# Patient Record
(demographics unavailable — no encounter records)

---

## 2017-02-09 NOTE — EMERGENCY ROOM REPORT
History of Present Illness


General


Chief Complaint:  Male Urogenital Problems


Source:  Patient





Present Illness


HPI


This is an 18-year-old male with no medical problem.  He presents with 

hematuria.  Was playing soccer today and the ball hit him in the pelvic area/

groin area.  He was not wearing a protective cup.  When he went home, he noted 

dark color urine.  He has some mild pain with urination.  He has some mild pain 

with sitting.  Urine cleared up but still reddish.  Denies any fever chills 

denies any bruising.  No other injury.  No back injury.


Allergies:  


Coded Allergies:  


     No Known Allergies (Unverified , 1/26/13)





Patient History


Past Medical History:  none


Past Surgical History:  none


Pertinent Family History:  none


Social History:  Denies: smoking


Immunizations:  other


Reviewed Nursing Documentation:  PMH: Agreed, PSxH: Agreed





Nursing Documentation-PMH


Past Medical History:  No Stated History





Review of Systems


Eye:  Denies: blurred vision, eye pain


ENT:  Denies: ear pain, nose congestion, throat swelling


Respiratory:  Denies: cough, shortness of breath


Cardiovascular:  Denies: chest pain, palpitations


Gastrointestinal:  Denies: abdominal pain, diarrhea, nausea, vomiting


Musculoskeletal:  Denies: back pain, joint pain


Skin:  Denies: rash


Neurological:  Denies: headache, numbness


Endocrine:  Denies: increased thirst, increased urine


Hematologic/Lymphatic:  Denies: easy bruising


All Other Systems:  negative except mentioned in HPI





Physical Exam





Vital Signs








  Date Time  Temp Pulse Resp B/P Pulse Ox O2 Delivery O2 Flow Rate FiO2


 


2/8/17 23:09 98.2 70 16 133/80 100 Room Air  





vitals normal


Sp02 EP Interpretation:  reviewed, normal


General Appearance:  well appearing, no apparent distress, alert


Head:  normocephalic, atraumatic


Eyes:  bilateral eye EOMI, bilateral eye PERRL


ENT:  hearing grossly normal, normal pharynx


Neck:  full range of motion, supple, no meningismus


Respiratory:  chest non-tender, lungs clear, normal breath sounds


Cardiovascular #1:  regular rate, rhythm, no murmur


Gastrointestinal:  normal bowel sounds, non tender, no mass, no organomegaly, 

no bruit, non-distended


Genitourinary:  normal inspection, no CVA tenderness, penis normal, other - no 

ecchymosis. no blood at the meatus.


Musculoskeletal:  back normal, gait/station normal, normal range of motion


Neurologic:  alert, oriented x3


Psychiatric:  mood/affect normal


Skin:  warm/dry





Medical Decision Making


Diagnostic Impression:  


 Primary Impression:  


 Hematuria


 Additional Impression:  


 Groin injury


 Qualified Codes:  S39.91XA - Unspecified injury of abdomen, initial encounter


ER Course


Patient presents with hematuria from ongoing injury.  On clinical exam, I see 

no evidence of penile fracture or hematoma or ecchymosis.  No testicular 

injury.  CT scan is negative.  CT scan has finding of periportal edema.  He has 

no symptoms whatsoever.  He may have mild rhabdomyolysis based on elevated CK 

after playing 80 minutes of soccer.  I discussed the case with urologist 

recommend followup. he urinated again here.  Urine is much clearer.


Lab Results Impression


labs unremarkable


CT/MRI/US Diagnostic Results


CT/MRI/US Diagnostic Results :  


   Imaging Test Ordered:  ct pelvis


   Impression


Read by radiologist. No pelvic frx.





Last Vital Signs








  Date Time  Temp Pulse Resp B/P Pulse Ox O2 Delivery O2 Flow Rate FiO2


 


2/8/17 23:09 98.2 70 16 133/80 100 Room Air  








Status:  improved


Disposition:  HOME, SELF-CARE


Condition:  Stable


Referrals:  


[facility],REFERRING (PCP)





Additional Instructions:  


Followup with your Dr. in 7 days.  Increase fluids.  Return if symptom worsen.  

Avoid aspirin.











JOHN DEAN M.D. Feb 9, 2017 00:06

## 2017-02-17 NOTE — DIAGNOSTIC IMAGING REPORT
Clinical Indication: TRAUMA abdominal pain



Technique:   No oral contrast utilized, per emergency room physician request  

IV

administration nonionic contrast. Venous phase spiral acquisition obtained through

the abdomen and pelvis. Multiplanar reconstructions were generated. Total dose

length product 717 mGycm. CTDIvol(s) 13 mGy



Comparison: None



Findings: The bones are intact. No evidence of fracture or other 

abnormality

demonstrated.



The liver demonstrates mild periportal edema. Otherwise unremarkable, no 

focal

abnormality. The gallbladder, bile ducts, pancreas, spleen, adrenals, kidneys 

are

unremarkable. No mesenteric or retroperitoneal mass or adenopathy. No pelvic mass 

or

adenopathy.



Normal appendix. No evidence of diverticulosis or diverticulitis. No free or

loculated intraperitoneal air or fluid is demonstrated. No small bowel distention.

Distal esophagus, stomach, duodenum are unremarkable.



The included lung bases are clear.



Impression: Essentially negative. No evidence of acute bony or solid organ trauma



Nonspecific hepatic periportal edema



This agrees with the preliminary interpretation provided overnight by 

[facility]

teleradiology service.











The CT scanner at [facility] is accredited by the [employer] College 

of

Radiology and the scans are performed using protocols designed to limit 

radiation

exposure to as low as reasonably achievable to attain images of sufficient

resolution adequate for diagnostic evaluation.

## 2017-10-09 NOTE — EMERGENCY ROOM REPORT
History of Present Illness


General


Chief Complaint:  Flu Like Symptoms


Source:  Patient





Present Illness


HPI


18-year-old male presents to the emergency department complaining of body aches

, fevers, sore throat, nonproductive cough, nasal congestion and ear pain 

described as pressure that he rates as 5/10 in severity x2 days.  Patient 

states he is not up-to-date with acutely fleabites recent ill contacts or 

recent travel.  Patient states she's been taking NyQuil with no relief.  

Patient denies history of asthma, COPD or smoking.  Patient denies neck pain or 

stiffness.  Abdominal pain or rashes. Denies CP, Palpitations, LOC, AMS, 

dizziness, Changes in Vision, Sensation, paresthesias, or a sudden severe 

headache.


Allergies:  


Coded Allergies:  


     No Known Allergies (Unverified , 1/26/13)





Patient History


Past Medical History:  see triage record


Past Surgical History:  none


Pertinent Family History:  none


Immunizations:  UTD


Reviewed Nursing Documentation:  PMH: Agreed, PSxH: Agreed





Nursing Documentation-PMH


Past Medical History:  No Stated History





Review of Systems


All Other Systems:  negative except mentioned in HPI





Physical Exam





Vital Signs








  Date Time  Temp Pulse Resp B/P (MAP) Pulse Ox O2 Delivery O2 Flow Rate FiO2


 


10/9/17 19:36 98.1   123/73    








Sp02 EP Interpretation:  reviewed, normal


General Appearance:  no apparent distress, alert, GCS 15, non-toxic


Head:  normocephalic, atraumatic


Eyes:  bilateral eye normal inspection, bilateral eye PERRL


ENT:  hearing grossly normal, normal pharynx, no angioedema, normal voice, TMs 

+ canals normal, uvula midline, nasal congestion - bilateral nasal congestion, 

pharyngeal erythema


Neck:  full range of motion, no meningismus, no bony tend, supple/symm/no masses


Respiratory:  chest non-tender, lungs clear, normal breath sounds, no rhonchi, 

no wheezing, speaking full sentences


Cardiovascular #1:  regular rate, rhythm


Musculoskeletal:  back normal, gait/station normal, normal range of motion, non-

tender


Neurologic:  alert, oriented x3, responsive, motor strength/tone normal, 

sensory intact, normal gait, speech normal


Psychiatric:  judgement/insight normal, memory normal, mood/affect normal


Skin:  normal color, no rash, warm/dry, well hydrated


Lymphatic:  other - bilateral anterior cervical LAD





Medical Decision Making


PA Attestation


Dr. tian is my supervising Physician whom patient management has been 

discussed with.


Diagnostic Impression:  


 Primary Impression:  


 Upper respiratory infection


 Qualified Codes:  J06.9 - Acute upper respiratory infection, unspecified; 

B97.89 - Other viral agents as the cause of diseases classified elsewhere


ER Course


18-year-old male presents to the emergency department complaining of body aches

, fevers, sore throat, nonproductive cough, nasal congestion and ear pain 

described as pressure that he rates as 5/10 in severity x2 days.  Patient 

states he is not up-to-date with acutely fleabites recent ill contacts or 

recent travel.  Patient states she's been taking NyQuil with no relief.  

Patient denies history of asthma, COPD or smoking.  Patient denies neck pain or 

stiffness.  Abdominal pain or rashes. Denies CP, Palpitations, LOC, AMS, 

dizziness, Changes in Vision, Sensation, paresthesias, or a sudden severe 

headache.





Ddx considered but are not limited to URI, pneumonia, PE, strep pharyngitis, 

meningitis, sinusitis





Vital signs: Pt. is afebrile, the remaining VS are WNL


H&PE are most consistent with URI- no meningeal signs, oropharynx is not 

involved, no evidence of bacterial infection at this time. 





ORDERS: none required at this time, the diagnosis is clinical





ED INTERVENTIONS: None required at this time. 








--PT. EDUCATION: Discussed antibiotic resistance with inappropriate prescribing 

of antibiotics for viral illnesses.  Discussed signs and symptoms to indicate 

viral illness versus bacterial illness. 





DISCHARGE: At this time pt. is stable for d/c to home. Will provide printed 

patient care instructions, and any necessary prescriptions. Care plan and 

follow up instructions have been discussed with the patient prior to discharge.





Last Vital Signs








  Date Time  Temp Pulse Resp B/P (MAP) Pulse Ox O2 Delivery O2 Flow Rate FiO2


 


10/9/17 19:36 98.1   123/73    








Disposition:  HOME, SELF-CARE


Condition:  Stable


Scripts


Acetaminophen* (TYLENOL EXTRA STRENGTH*) 500 Mg Tablet


500 MG ORAL Q6H Y for Mild Pain/Temp > 100.5, #20 TAB 0 Refills


   Prov: Sofya Coleman P.A.         10/9/17 


Oxymetazoline Hcl* (12 HOUR NASAL SPRAY*) 30 Ml [place]


1 SPRAY NASAL TWICE A DAY for 3 Days, #30 SPRAY


   Prov: Sofya Coleman P.AAlexia         10/9/17 


Guaifenesin (Guaifenesin) 1,200 Mg Tab.er.12h


1200 MG PO BID for 10 Days, #20 TAB


   Prov: Sofya Coleman         10/9/17 


Codeine/Promethazine Hcl* (PROMETHAZINE-CODEINE SYRUP*) 118 Ml [place]


5 ML ORAL Q6H Y for For Cough, #118 ML 0 Refills


   Prov: Sofya Coleman         10/9/17


Departure Forms:  Return to School   Return to School On:  Oct 13, 2017


   School Release Restrictions:  None


      Return to Full Activity:  Oct 13, 2017


Patient Instructions:  Upper Respiratory Infection, Adult





Additional Instructions:  


Take medications as directed. 


 ** Follow up with a Primary Care Provider in 3-5 days, even if your symptoms 

have resolved. ** 


--Please review list of primary care clinics, if you do not already have a 

primary care provider





Return sooner to ED if new symptoms occur, or current symptoms become worse. 


Do not drink alcohol, drive, or operate heavy machinery while taking Cough 

Syrup as this may cause drowsiness. 











- Please note that this Emergency Department Report was dictated using [employer] technology software, occasionally this can lead to 

erroneous entry secondary to interpretation by the dictation equipment.











Sofya Coleman Oct 9, 2017 20:04